# Patient Record
Sex: MALE | Race: WHITE | Employment: FULL TIME | URBAN - METROPOLITAN AREA
[De-identification: names, ages, dates, MRNs, and addresses within clinical notes are randomized per-mention and may not be internally consistent; named-entity substitution may affect disease eponyms.]

---

## 2018-10-22 ENCOUNTER — THERAPY VISIT (OUTPATIENT)
Dept: PHYSICAL THERAPY | Facility: CLINIC | Age: 73
End: 2018-10-22
Payer: COMMERCIAL

## 2018-10-22 DIAGNOSIS — M54.2 CERVICAL PAIN: Primary | ICD-10-CM

## 2018-10-22 PROCEDURE — 97140 MANUAL THERAPY 1/> REGIONS: CPT | Mod: GP | Performed by: PHYSICAL THERAPIST

## 2018-10-22 PROCEDURE — 97161 PT EVAL LOW COMPLEX 20 MIN: CPT | Mod: GP | Performed by: PHYSICAL THERAPIST

## 2018-10-22 PROCEDURE — G8982 BODY POS GOAL STATUS: HCPCS | Mod: GP | Performed by: PHYSICAL THERAPIST

## 2018-10-22 PROCEDURE — G8981 BODY POS CURRENT STATUS: HCPCS | Mod: GP | Performed by: PHYSICAL THERAPIST

## 2018-10-22 PROCEDURE — 97112 NEUROMUSCULAR REEDUCATION: CPT | Mod: GP | Performed by: PHYSICAL THERAPIST

## 2018-10-22 NOTE — LETTER
[facility]  [address] [address] [place] [id number]  [phone]    2018    Re: Eriberto Tristan   :   1945  MRN:  [id number]   REFERRING PHYSICIAN:   Zaki Alvarado    [facility]    Date of Initial Evaluation:  10/22/2018  Visits:  Rxs Used: 1  Reason for Referral:  Cervical pain    EVALUATION SUMMARY    [facility] Initial Evaluation    Subjective:  Patient is a 73 year old male presenting with rehab cervical spine hpi.   Eriberto Tristan is a 73 year old male with a cervical spine condition.  Condition occurred with:  Insidious onset.  Condition occurred: for unknown reasons.  This is a new condition  Pt presents to clinic with complaints of R sided upper cervical pain starting 6 months ago for unknown reasons. Pt first noticed slight increase in tingling upon waking. Pt continues to have most pain in neck by end of the day. Pt had MD appointment on 10/17/18 and referred to PT. .    Patient reports pain:  Cervical right side and upper cervical spine.  Radiates to:  None.  Pain is described as aching and is intermittent and reported as 2/10 and 4/10.  Associated symptoms:  Loss of motion/stiffness and loss of strength. Pain is worse in the P.M..  Symptoms are exacerbated by sitting, driving, rotating head, lying down and looking up or down and relieved by NSAID's.  Since onset symptoms are unchanged.  Special testing: none.  Previous treatment: none.    General health as reported by patient is good.                Barriers include:  None as reported by the patient.  Red flags:  None as reported by the patient.  Objective:  Standing Alignment:    Cervical/Thoracic:  Forward head  Shoulder/UE:  Rounded shoulders  Cervical/Thoracic Evaluation  AROM:  AROM Cervical:  Flexion:            WNL -pain  Extension:       50% +pain R  Rotation:         Left: 75 deg     Right: 65 deg  Side Bend:      Left: 20 deg     Right:  20 deg  Headaches:  none  Cervical Myotomes:  normal        Re: Eriberto Tristan   :   1945    Cervical Dermatomes:  normal  Cervical Palpation:    Tenderness not present at Left:   Erector Spinae or Suboccipitals  Tenderness present at Right:    Erector Spinae and Suboccipitals  Cervical Stability/Joint Clearing:    Left negative at: TLA LAT  Right negative at:  TLA LAT  Negative:ALAR Ligament and TLA AP    Assessment/Plan:    Patient is a 73 year old male with cervical complaints.    Patient has the following significant findings with corresponding treatment plan.                Diagnosis 1:  Cervical pain  Pain -  hot/cold therapy, manual therapy, self management, education and home program  Decreased ROM/flexibility - manual therapy, therapeutic exercise, therapeutic activity and home program  Decreased strength - therapeutic exercise, therapeutic activities and home program  Impaired muscle performance - neuro re-education and home program  Decreased function - therapeutic activities and home program  Impaired posture - neuro re-education, therapeutic activities and home program  Therapy Evaluation Codes:   1) History comprised of:   Personal factors that impact the plan of care:      None.    Comorbidity factors that impact the plan of care are:      High blood pressure and Overweight.     Medications impacting care: High blood pressure.  2) Examination of Body Systems comprised of:   Body structures and functions that impact the plan of care:      Cervical spine.   Activity limitations that impact the plan of care are:      Driving, Reading/Computer work, Sitting and Laying down.  3) Clinical presentation characteristics are:   Stable/Uncomplicated.  4) Decision-Making    Low complexity using standardized patient assessment instrument and/or measureable assessment of functional outcome.  Cumulative Therapy Evaluation is: Low complexity.  Previous and current functional limitations:  (See Goal Flow Sheet for this  information)    Short term and Long term goals: (See Goal Flow Sheet for this information)   Communication ability:  Patient appears to be able to clearly communicate and understand verbal and written communication and follow directions correctly.  Treatment Explanation - The following has been discussed with the patient:   RX ordered/plan of care  Anticipated outcomes  Possible risks and side effects  This patient would benefit from PT intervention to resume normal activities.   Rehab potential is good.      Re: Eriberto Tristan   :   1945    Frequency:  1 X week, once daily  Duration:  for 6 weeks  Discharge Plan:  Achieve all LTG.  Independent in home treatment program.  Return to previous functional level by discharge.  Reach maximal therapeutic benefit.            Thank you for your referral.    INQUIRIES  Therapist: FLORESITA Le[address] [address]  Phone: [phone]  Fax: [phone]

## 2018-10-22 NOTE — PROGRESS NOTES
[facility] Initial Evaluation  Subjective:  Patient is a 73 year old male presenting with rehab left ankle/foot hpi.                                      Pertinent medical history includes:  High blood pressure and overweight.      Current medications:  High blood pressure medication.  Current occupation is Sales.    Primary job tasks include:  Driving and other (Computer Work).                                Objective:  System    Physical Exam    General     ROS    Assessment/Plan:

## 2019-01-02 PROBLEM — M54.2 CERVICAL PAIN: Status: RESOLVED | Noted: 2018-10-22 | Resolved: 2019-01-02
